# Patient Record
Sex: FEMALE | Race: AMERICAN INDIAN OR ALASKA NATIVE
[De-identification: names, ages, dates, MRNs, and addresses within clinical notes are randomized per-mention and may not be internally consistent; named-entity substitution may affect disease eponyms.]

---

## 2017-04-17 ENCOUNTER — HOSPITAL ENCOUNTER (OUTPATIENT)
Dept: HOSPITAL 5 - OR | Age: 57
Discharge: HOME | End: 2017-04-17
Attending: UROLOGY
Payer: COMMERCIAL

## 2017-04-17 VITALS — DIASTOLIC BLOOD PRESSURE: 69 MMHG | SYSTOLIC BLOOD PRESSURE: 143 MMHG

## 2017-04-17 DIAGNOSIS — I10: ICD-10-CM

## 2017-04-17 DIAGNOSIS — E03.9: ICD-10-CM

## 2017-04-17 DIAGNOSIS — Z79.899: ICD-10-CM

## 2017-04-17 DIAGNOSIS — N13.5: ICD-10-CM

## 2017-04-17 DIAGNOSIS — Z83.3: ICD-10-CM

## 2017-04-17 DIAGNOSIS — N20.1: Primary | ICD-10-CM

## 2017-04-17 DIAGNOSIS — Z82.3: ICD-10-CM

## 2017-04-17 DIAGNOSIS — K21.9: ICD-10-CM

## 2017-04-17 DIAGNOSIS — E66.9: ICD-10-CM

## 2017-04-17 DIAGNOSIS — Z90.49: ICD-10-CM

## 2017-04-17 DIAGNOSIS — Z82.49: ICD-10-CM

## 2017-04-17 DIAGNOSIS — Z80.3: ICD-10-CM

## 2017-04-17 DIAGNOSIS — Z98.890: ICD-10-CM

## 2017-04-17 DIAGNOSIS — J45.909: ICD-10-CM

## 2017-04-17 DIAGNOSIS — Z90.710: ICD-10-CM

## 2017-04-17 DIAGNOSIS — N81.10: ICD-10-CM

## 2017-04-17 PROCEDURE — C1769 GUIDE WIRE: HCPCS

## 2017-04-17 PROCEDURE — 36415 COLL VENOUS BLD VENIPUNCTURE: CPT

## 2017-04-17 PROCEDURE — 74420 UROGRAPHY RTRGR +-KUB: CPT

## 2017-04-17 PROCEDURE — 52341 CYSTO W/URETER STRICTURE TX: CPT

## 2017-04-17 PROCEDURE — 52332 CYSTOSCOPY AND TREATMENT: CPT

## 2017-04-17 PROCEDURE — C2617 STENT, NON-COR, TEM W/O DEL: HCPCS

## 2017-04-17 PROCEDURE — 84132 ASSAY OF SERUM POTASSIUM: CPT

## 2017-04-17 RX ADMIN — HYDROMORPHONE HYDROCHLORIDE PRN MG: 1 INJECTION, SOLUTION INTRAMUSCULAR; INTRAVENOUS; SUBCUTANEOUS at 11:46

## 2017-04-17 RX ADMIN — HYDROMORPHONE HYDROCHLORIDE PRN MG: 1 INJECTION, SOLUTION INTRAMUSCULAR; INTRAVENOUS; SUBCUTANEOUS at 11:41

## 2017-04-17 NOTE — SHORT STAY SUMMARY
Short Stay Documentation


Date of service: 04/17/17





- History


H&P: obtained from office





- Allergies and Medications


Current Medications: 


 Allergies





No Known Allergies Allergy (Verified 04/14/17 11:36)


 





 Home Medications











 Medication  Instructions  Recorded  Confirmed  Last Taken  Type


 


ALBUTEROL Inhaler [Proair] 2 puff IH QID PRN 04/14/17 04/17/17 2 Months Ago 

History


 


Hydrochlorothiazide [HCTZ] 25 mg PO QDAY 04/14/17 04/17/17 04/16/17 History


 


Levothyroxine [Synthroid] 100 mcg PO QAM 04/14/17 04/14/17 04/17/17 08:10 

History


 


Lisinopril [Zestril] 20 mg PO QDAY 04/14/17 04/14/17 04/17/17 08:10 History


 


oxyCODONE /ACETAMINOPHEN [Percocet 1 tab PO PRN PRN 04/14/17 04/14/17 Unknown 

History





5/325 mg]     


 


Latanoprost [Latanoprost] 1 drop OU HS 04/17/17 04/17/17 04/16/17 History








Active Medications





Cefazolin Sodium (Ancef/Sterile Water 2 Gm/20 Ml)  2 gm IV PREOP NR


   Stop: 04/17/17 23:59


Famotidine (Pepcid)  20 mg PO PREOP NR


   Stop: 04/17/17 23:59


   Last Admin: 04/17/17 10:00 Dose:  20 mg


Fentanyl (Sublimaze)  50 mcg IV Q5MIN PRN


   PRN Reason: Pain , Severe (7-10)


   Stop: 04/17/17 23:59


Hydromorphone HCl (Dilaudid)  0.25 mg IV Q5MIN PRN


   PRN Reason: Pain, Moderate (4-6)


   Stop: 04/17/17 23:59


Sodium Chloride (Nacl 0.9% 1000 Ml)  1,000 mls @ 100 mls/hr IV AS DIRECT AISHA


   Stop: 04/17/17 23:59


   Last Admin: 04/17/17 10:10 Dose:  100 mls/hr


Midazolam HCl (Versed)  2 mg IV PREOP NR


   Stop: 04/17/17 23:59


   Last Admin: 04/17/17 10:15 Dose:  2 mg


Ondansetron HCl (Zofran)  4 mg IV ONCE PRN


   PRN Reason: Nausea And Vomiting


   Stop: 04/17/17 23:59











- Brief post op/procedure progress note


Date of procedure: 04/17/17


Pre-op diagnosis: left distal ureteral stone


Post-op diagnosis: other (passed stone, stricture)


Procedure: 





cysto, rpg,ureteral dilation, left ureteroscopy, stent with external string


Anesthesia: KACI


Surgeon: MADHAV LOPEZ


Estimated blood loss: none


Pathology: none


Condition: stable





- Hospital course


Hospital course: 








norco & cipro on chart





- Disposition


Condition at discharge: Stable





Short Stay Discharge Plan


Follow up with: 


NATHAN JONES MD [Primary Care Provider] - 7 Days

## 2017-04-17 NOTE — OPERATIVE REPORT
PREOPERATIVE DIAGNOSIS:  Left distal stone.



POSTOPERATIVE DIAGNOSES:  Left distal stone, passed the stone, left distal

ureteral stricture.



PROCEDURE:  Cystoscopy, bilateral retrograde pyelograms, dilation of left

ureter, left flexible ureterorenoscopy, double-J stent (6 Taiwanese 24 cm with an

external string).



SURGEON:  Casimiro Yeung MD



ANESTHESIA:  General.



ESTIMATED BLOOD LOSS:  Minimal.



FLUIDS:  Crystalloid.



COMPLICATIONS:  No complications.



INDICATIONS:  This patient is a 56-year-old female actually seen a year ago in

the office for stones who lost to followup.  CT at that time revealed 2 mm

bilateral stones.  Now, she presents with worsening pain.  She does do lifting

at work.  KUB in the office showed distal calcification.  Based on these

findings, we agreed to proceed with surgical intervention.



DESCRIPTION OF PROCEDURE:  The patient was taken to the operative suite, placed

in a supine position.  After adequate general anesthesia, she was placed in a

dorsal lithotomy position, prepped and draped in a sterile fashion.  She was

noted to have a moderate cystocele.  Pancystourethroscopy was performed with a

22 Taiwanese Storz cystoscope, no acute bladder pathology.  No tumors or stones

were noted.  Mild amount of erythema around the left ureteral orifice.  The

cystocele was reduced with ______ in the vaginal vault.  Bilateral retrograde

pyelograms were obtained with an 8 Taiwanese Rosedale catheter and 8 mL of

contrast.  No filling defects or obstruction on the right.  Left side, still

questionable stone in the distal ureter.  Two 0.035 guidewires were placed in

the right and left collecting system under fluoroscopic guidance.  Attempts at

ureteroscopy was unsuccessful due to stenosis.  A 20-Taiwanese access sheath was

inserted and gentle dilation was performed.  Ureterorenoscopy was performed.  No

stone could be appreciated.  The stenosis was now dilated.  She may have passed

a stone.  A 6-Taiwanese 24 cm double-J stent was left indwelling.  Bladder was

drained.  She was extubated and taken to recovery room.  She will go home on

Cipro and Norco and follow up in the office.



Primary care is Dr. Jatinder Hernandez, gastroenterologist is Dr. Gerardo Currie.





DD: 04/17/2017 11:28

DT: 04/17/2017 12:50

JOB# [id number]  [id number]

[facility]

## 2017-04-17 NOTE — ANESTHESIA DAY OF SURGERY
Anesthesia Day of Surgery





- Day of Surgery


Patient is NPO: Yes


Beta Blockers: No


Cardiac Clearance: No


Pulmonary Clearance: No

## 2017-04-17 NOTE — ANESTHESIA CONSULTATION
Anesthesia Consult and Med Hx


Date of service: 04/17/17





- Airway


Anesthetic Teeth Evaluation: Dentures


ROM Head & Neck: Adequate


Mental/Hyoid Distance: Adequate


Mallampati Class: Class II


Intubation Access Assessment: Probably Good





- Pulmonary Exam


CTA: Yes (clear blbs no wheeze)





- Cardiac Exam


Cardiac Exam: RRR





- Pre-Operative Health Status


ASA Pre-Surgery Classification: ASA3


Proposed Anesthetic Plan: General





- Pulmonary


Hx Smoking: No


Hx Asthma: Yes (RARE INHALER USE/never hospitalized/no ER visits/no recent URI)


Hx Sleep Apnea: No (KENNEDY PRE SCREEN HIGH RISK)





- Cardiovascular System


Hx Hypertension: Yes (X 10 YRS- ON MEDS)





- Central Nervous System


Hx Back Pain: Yes (??kidney stone)





- Gastrointestinal


Hx Gastroesophageal Reflux Disease: Yes (only after meals/not when NPO)





- Endocrine


Hx Renal Disease: Yes (kidney stone)


Hx Hypothyroidism: Yes (ON MEDS)





- Other Systems


Hx Cancer: No


Hx Obesity: Yes

## 2017-04-18 NOTE — FLUOROSCOPY REPORT
RETROGRADE PYELOGRAM:



History: Left ureteral stone.



There is adequate filling of the ureters and intrarenal collecting 

systems with no filling defects or anatomic abnormalities identified. 

Left ureteroscopy was also performed. A left ureteral stent was placed 

which is in good position on the final image. Please correlate with the 

procedural report by Dr. Yeung.

## 2017-04-18 NOTE — ADMIT CRITERIA FORM
Admission Criteria Documentation: 





            AMBULATORY SURGERY  EXCEPTION CRITERIA 





Ambulatory Surgery Exception Criteria


                                                                               (

Place 'X' for any and all applicable criteria): 





Surgery or procedure performed on ambulatory basis may require inpatient stay 

for[A] ANY ONE of the following(1)(2)(3)(4)(5)(6)(7)(8)(9):


[X] I.   A preoperative situation, condition, or finding that warrants 

inpatient stay as indicated by ANY  ONE of the following:


   []   a)   Inpatient care needed because of severity of a disease or 

condition rather than the surgery (eg, 


              severe cardiac or respiratory disease, severe infection) (15) (16

) (17) (18)


   []   b)   Emergent procedure (eg, angioplasty for acute ischemia)(19)


   []   c)   Complex surgical approach or situation as indicated by ANY  ONE of 

the following(3):


        []   i)    Open approach needed instead of usual endoscopic, 

transcatheter, or other less invasive procedure


        []   ii)   Difficult approach because of previous operation


        []   iii)  Airway monitoring required after open neck procedures(20)(21)


        []   iv)  Large mass requiring unusually extensive dissection


        []   v)  Additional complicating feature requiring inpatient care (eg, 

drain management)(22(23):


   [X]   d)   Major surgery in a pt with high anesthetic risk as indicated by 

ANY  ONE of the following (2)(3)(5)(7)(8):


        [X]   i)     ASA risk class III or higher (severe systemic disease 

impairing function) [D]


        []   ii)    Advanced age (eg, older than 85 years)(14)(24)             

                   


        []   iii)   Symptomatic heart failure(25)


        []   iv)   Symptomatic asthma or COPD(8)(21)


        []    v)   Morbid obesity with hemodynamic or respiratory problems(20)(

21)(26)(27)


        []   vi)   Obstructive sleep apnea(20)(21)


        []   vii)   Former premature infants who are younger than 60 weeks


        []   viii)  High risk for severe postoperative abnormalities (eg, 

severe postoperative hypocalcemia 


                    after parathyroidectomy for severe hyperparathyroidism)(27)(

28)


        []   ix)   Unstable angina(25) 


   []   e)  Drug-related risk requiring inpatient stay as indicated by ANY  ONE

  of the following(5)(10)(14)(32)(33)


        []   i)     Procedure requires discontinuing drugs or other therapy (eg

, antiarrhythmic    medication,   


                    antiseizure medication), which necessitates inpatient 

observation or treatment.(18)(31)


        []   ii)    Major surgery and high risk drug use as indicated by ANY  

ONE   of the following:


             []     1)    Active abuse of cocaine or similar drug      


             []     2)    Monoamine oxidase inhibitor use


             []     3)    Other drug identified as posing risk


   []   f)   Inadequate outpatient care situation as indicated by ANY  ONE  of 

the following(5)(10)(14)(32)(33)


        []   i)   Patient lives remote from medical facility and procedure has 

urgent complication potential, 


                  and temporary nearby residence cannot be arranged


        []  ii)   Patient will have postprocedure incapacitation and inadequate 

assistance at home, or alternative level of care cannot be arranged.


        []  iii)   Patient will have long general anesthesia or procedure side 

effect resolution time, and 


                  competent person to stay with patient on first postoperative 

night at home or alternative  level of care cannot be arranged.


        []iv)    Other inadequate outpatient situation that cannot be handled 

by other means


[] II.  A perioperative event, condition, or finding that warrants inpatient 

stay as indicated by ANY  ONE of the following (1)(2)(3):        


   []   a)   Inadequate physiologic recovery: cardiovascular,  respiratory, or 

hemodynamic status not normal or near preoperative baseline(18)                

                                                                               

          


   []   b)   Hemodynamic instability


   []   c)   Patient not alert with near normal or baseline mental status


   []   d)   Temperature not normal or as expected and not appropriate for 

outpatient treatment of condition 


   []   e)   Ambulatory or appropriate activity level status not yet achieved 

post procedure [E](34)(35)(36)


   []   f)    Operative site not appropriate (eg, unexpected or excessive 

drainage or bleeding)


   []   g)   Postoperative effects not resolved or adequately managed (eg, 

significant pain or vomiting not 


              appropriate for outpatient or next level of care)(10)(12)


   []   h)   Complicating features requiring inpatient care as indicated by ANY

  ONE  of the following(37):


        []   i)    Severe complications of procedure (eg, bowel injury, airway 

compromise, vascular injury,severe hemorrhage)


        []   ii)   Extensive (eg, dissection far beyond usual scope of procedure

) or prolonged (eg, 120 minutes  


                   beyond usual) surgery needed requiring inpatient 

postoperative care


        []   iii)  Conversion to an open or complex procedure that requires 

inpatient care (eg, open vs 


                   laparoscopic cholecystectomy, abdominal vs vaginal 

hysterectomy)(38)


        []   iv)  Comorbid condition or test result identified during or post 

procedure that requires inpatient care (7)


        []   v)   Malignant hyperthermia(30)


        []   vi)  Other complicating feature requiring inpatient care(22)(23)











Inpatient stay may be needed until ALL of the following are present (1)(2)(3)(4)

(5)(6)(10)(14)(33)(40):


[]a)   Physiologic recovery: cardiovascular, respiratory, and hemodynamic 

status normal or near preoperative baseline


[]b)   Hemodynamic stability    


[]c)   Patient alert, with near normal or baseline mental status


[]d)   Temperature appropriate: patient afebrile or temperature appropriate for 

outpt treatment of condition 


[]e)   Activity level appropriate: ambulatory or appropriate activity level 

post procedure


[]f)   Operative site appropriate as indicated by ALL of the following:


       []i)    Site dry or with expected drainage 


       []ii)   Any blood noted is as expected for procedure.


[]g)  Postoperative effects resolved or managed as indicated by ALL of the 

following:


       []i)    Pain management appropriate for outpatient (or next level of) 

care(10)


       []ii)   Minimal nausea and vomiting: if present, successfully treated 

with oral medication(12)


       []iii)   Headache, dizziness, or drowsiness (if present) are mild.


[]h)  Voiding status acceptable as indicated by ANY  ONE  of the following:


       []i)    Voiding spontaneously    


       []ii)   No voiding but instructions given for follow-up in 6 to 8 hours 


       []iii)  Urinary catheter in place, and instructions given for follow-up


[]i)   Complicating features requiring inpatient care manageable at a lower 

level of care(37)


[]j)   Comorbid conditions manageable at a lower level of care(37)











The original [employer] content created by [employer] has been revised. 


The portions of the content which have been revised are identified through the 

use of italic text or in bold, and [facility] [email or web address] 


has neither reviewed nor approved the modified material. All other unmodified 

content is copyright  [employer].





Please see references footnoted in the original [employer] edition 

2016                                                                    


   


Admission Criteria Met: Yes

## 2021-01-06 ENCOUNTER — HOSPITAL ENCOUNTER (OUTPATIENT)
Dept: HOSPITAL 5 - SPVWC | Age: 61
Discharge: HOME | End: 2021-01-06
Attending: SURGERY
Payer: COMMERCIAL

## 2021-01-06 DIAGNOSIS — Z12.31: Primary | ICD-10-CM

## 2021-01-06 PROCEDURE — 77067 SCR MAMMO BI INCL CAD: CPT

## 2021-01-19 ENCOUNTER — HOSPITAL ENCOUNTER (OUTPATIENT)
Dept: HOSPITAL 5 - SPVWC | Age: 61
Discharge: HOME | End: 2021-01-19
Attending: SURGERY
Payer: COMMERCIAL

## 2021-01-19 DIAGNOSIS — R92.1: Primary | ICD-10-CM

## 2021-01-19 NOTE — MAMMOGRAPHY REPORT
DIGITAL DIAGNOSTIC MAMMOGRAM WITH CAD , 1/19/2021



CLINICAL INFORMATION / INDICATION: ABNORMAL MAMMO



TECHNIQUE:  Digital right mammographic imaging was performed. Magnification views were obtained.

This examination was interpreted with the benefit of Computer-aided Detection analysis. 



COMPARISON: 1/6/2021



FINDINGS: 



Breast Density: The breasts are heterogeneously dense, which may obscure small masses.



There are grouped calcifications in the right breast at 9:00, middle depth,  spanning a distance of 1
5 mm. Calcifications are heterogeneous in appearance, and will require further evaluation with biopsy
. Calcifications are amenable to stereotactic guided biopsy if clinically desired.



IMPRESSION: Heterogeneous grouped calcifications right breast, 9:00. Recommend stereotactic biopsy.



Follow up recommendation: Biopsy



BI-RADS Category 4: Suspicious for Malignancy. 



-------------------------------------------------------------------------------------------

A "normal" or negative report should not discourage follow up or biopsy of a clinically significant f
inding.



A written summary of these findings will be mailed to the patient. The patient will be entered into a
 mammography reporting system which will generate a reminder letter for the patient's next appointmen
t at the appropriate interval.



According to the American College of Radiology, yearly mammograms are recommended starting at age 40 
and continuing as long as a woman is in good health.  Breast MRI is recommended for women with an louise
roximately 20-25% or greater lifetime risk of breast cancer, including women with a strong family his
tory of breast or ovarian cancer and women who have been treated for Hodgkin's disease.



Signer Name: Tania Sanders MD 

Signed: 1/19/2021 10:22 AM

Workstation Name: [id number]

## 2021-02-03 ENCOUNTER — HOSPITAL ENCOUNTER (OUTPATIENT)
Dept: HOSPITAL 5 - SPVWC | Age: 61
Discharge: HOME | End: 2021-02-03
Attending: SURGERY
Payer: COMMERCIAL

## 2021-02-03 DIAGNOSIS — R92.1: Primary | ICD-10-CM

## 2021-02-03 DIAGNOSIS — D05.11: ICD-10-CM

## 2021-02-03 DIAGNOSIS — Z98.890: ICD-10-CM

## 2021-02-03 DIAGNOSIS — Z90.49: ICD-10-CM

## 2021-02-03 DIAGNOSIS — Z87.442: ICD-10-CM

## 2021-02-03 DIAGNOSIS — I10: ICD-10-CM

## 2021-02-03 DIAGNOSIS — E78.00: ICD-10-CM

## 2021-02-03 DIAGNOSIS — Z79.899: ICD-10-CM

## 2021-02-03 DIAGNOSIS — E66.9: ICD-10-CM

## 2021-02-03 DIAGNOSIS — J45.909: ICD-10-CM

## 2021-02-03 DIAGNOSIS — Z17.0: ICD-10-CM

## 2021-02-03 DIAGNOSIS — Z90.710: ICD-10-CM

## 2021-02-03 DIAGNOSIS — E03.9: ICD-10-CM

## 2021-02-03 DIAGNOSIS — K21.9: ICD-10-CM

## 2021-02-03 DIAGNOSIS — H40.9: ICD-10-CM

## 2021-02-03 PROCEDURE — 88305 TISSUE EXAM BY PATHOLOGIST: CPT

## 2021-02-03 PROCEDURE — 77065 DX MAMMO INCL CAD UNI: CPT

## 2021-02-03 PROCEDURE — A4648 IMPLANTABLE TISSUE MARKER: HCPCS

## 2021-02-03 PROCEDURE — 19081 BX BREAST 1ST LESION STRTCTC: CPT

## 2021-02-03 NOTE — MAMMOGRAPHY REPORT
PERCUTANEOUS STEREOTACTIC-GUIDED RIGHT BREAST BIOPSY WITH MARKER PLACEMENT



HISTORY: Right breast calcifications.



CONSENT: Technique, risks and alternatives were discussed with the patient and informed written conse
nt obtained.



PROCEDURE:



The patient was placed in the prone position on the Siemens biopsy table. The calcifications within t
he right breast at the 9:00 position were targeted mammographically.  and stereo pair images wer
e acquired to generate the computer-derived coordinates for targeting. The skin overlying the chosen 
biopsy site were cleansed with Betadine.  The skin and superficial soft tissues were anesthetized wit
h a small amount of buffered 1% lidocaine.  The deeper soft tissues were anesthetized with buffered 1
% lidocaine with epinephrine.



A small dermatotomy was created through which the [facility] biopsy device was placed. Pre and post fire st
ereo pair images were acquired to confirm appropriate needle trajectory. Using vacuum assistance, mul
tiple core specimen samples were acquired. A post procedure specimen radiograph confirmed calcificati
ons. Additional samples were obtained and more calcifications were identified.  A biopsy marker was d
eposited at the biopsy site, and appropriate biopsy marker deposition confirmed via a stereo pair gustavo
ge.



Manual pressure was applied at the biopsy site to achieve hemostasis. The incision margins were appro
ximated with Steri-Strips. Postprocedure care instructions were administered in both verbal and writt
en forms. The patient voiced understanding and departed the [facility] in stable, satisfactory con
dition.



**IMPRESSION**

 

Technically successful stereotactic biopsy of calcifications within the right breast at the 9:00 posi
tion with accurate placement of a biopsy marker.



An addendum will be added to this report once pathology results are available.



Signer Name: Sohail Correia MD 

Signed: 2/3/2021 2:46 PM

Workstation Name: [id number]

## 2021-02-03 NOTE — MAMMOGRAPHY REPORT
RIGHT DIAGNOSTIC MAMMOGRAM

 

INDICATION: Right lateral breast calcifications. 



COMPARISON: 1/19/2021, 1/6/2021.



FINDINGS: Right breast CC and ML projection mammograms were obtained. These document accurate locatio
n of a biopsy marker status post stereotactic biopsy of right breast calcifications at the 9:00 posit
ion.



IMPRESSION:



Technically successful stereotactic biopsy of right breast calcifications at the 9:00 position with a
ccurate biopsy marker placement.



BI-RADS Category 4: Suspicious for Malignancy. 





Signer Name: Sohail Correia MD 

Signed: 2/3/2021 2:33 PM

Workstation Name: [id number]

## 2021-03-04 ENCOUNTER — HOSPITAL ENCOUNTER (OUTPATIENT)
Dept: HOSPITAL 5 - SPVWC | Age: 61
Discharge: HOME | End: 2021-03-04
Attending: INTERNAL MEDICINE
Payer: COMMERCIAL

## 2021-03-04 DIAGNOSIS — Z13.820: Primary | ICD-10-CM

## 2021-03-04 DIAGNOSIS — D05.11: ICD-10-CM

## 2021-03-04 DIAGNOSIS — E66.9: ICD-10-CM

## 2021-03-04 PROCEDURE — 77080 DXA BONE DENSITY AXIAL: CPT

## 2021-03-04 NOTE — MAMMOGRAPHY REPORT
DEXA BONE DENSITY SCAN



INDICATION: 

OSTEOPOROSIS SCREENING.



COMPARISON: 

None available.



LUMBAR SPINE (L1-L4):

Bone mineral density (BMD) is 1.063 g/cm2.

T-score is -0.8 (standard deviations of Young Adult mean).

Z-score is 1 (standard deviations of Age Matched mean).



LEFT FEMORAL NECK:

Bone mineral density (BMD) is 0.868 g/cm2.

T-score is -0.6 (standard deviations of Young Adult mean).

Z-score is 0.6 (standard deviations of Age Matched mean).





IMPRESSION:

1. WHO Classification: Normal bone density. Fracture Risk: Not Increased.



Signer Name: Red Gabriel MD 

Signed: 3/4/2021 3:38 PM

Workstation Name: [email or web address]

## 2021-03-22 LAB
BUN SERPL-MCNC: 12 MG/DL (ref 7–17)
BUN/CREAT SERPL: 17 %
CALCIUM SERPL-MCNC: 9.6 MG/DL (ref 8.4–10.2)
HCT VFR BLD CALC: 36.9 % (ref 30.3–42.9)
HEMOLYSIS INDEX: 0
HGB BLD-MCNC: 11.9 GM/DL (ref 10.1–14.3)
MCHC RBC AUTO-ENTMCNC: 32 % (ref 30–34)
MCV RBC AUTO: 79 FL (ref 79–97)
PLATELET # BLD: 265 K/MM3 (ref 140–440)
RBC # BLD AUTO: 4.7 M/MM3 (ref 3.65–5.03)

## 2021-03-26 ENCOUNTER — HOSPITAL ENCOUNTER (OUTPATIENT)
Dept: HOSPITAL 5 - OR | Age: 61
Discharge: HOME | End: 2021-03-26
Attending: SURGERY
Payer: COMMERCIAL

## 2021-03-26 VITALS — DIASTOLIC BLOOD PRESSURE: 75 MMHG | SYSTOLIC BLOOD PRESSURE: 160 MMHG

## 2021-03-26 DIAGNOSIS — M19.90: ICD-10-CM

## 2021-03-26 DIAGNOSIS — Z79.84: ICD-10-CM

## 2021-03-26 DIAGNOSIS — I10: ICD-10-CM

## 2021-03-26 DIAGNOSIS — Z90.710: ICD-10-CM

## 2021-03-26 DIAGNOSIS — H40.9: ICD-10-CM

## 2021-03-26 DIAGNOSIS — Z79.899: ICD-10-CM

## 2021-03-26 DIAGNOSIS — Z88.8: ICD-10-CM

## 2021-03-26 DIAGNOSIS — J45.909: ICD-10-CM

## 2021-03-26 DIAGNOSIS — Z80.8: ICD-10-CM

## 2021-03-26 DIAGNOSIS — E78.00: ICD-10-CM

## 2021-03-26 DIAGNOSIS — Z98.890: ICD-10-CM

## 2021-03-26 DIAGNOSIS — E03.9: ICD-10-CM

## 2021-03-26 DIAGNOSIS — Z87.442: ICD-10-CM

## 2021-03-26 DIAGNOSIS — C50.411: Primary | ICD-10-CM

## 2021-03-26 DIAGNOSIS — Z80.2: ICD-10-CM

## 2021-03-26 DIAGNOSIS — K21.9: ICD-10-CM

## 2021-03-26 DIAGNOSIS — Z20.822: ICD-10-CM

## 2021-03-26 PROCEDURE — 19301 PARTIAL MASTECTOMY: CPT

## 2021-03-26 PROCEDURE — 82962 GLUCOSE BLOOD TEST: CPT

## 2021-03-26 PROCEDURE — 64450 NJX AA&/STRD OTHER PN/BRANCH: CPT

## 2021-03-26 PROCEDURE — U0003 INFECTIOUS AGENT DETECTION BY NUCLEIC ACID (DNA OR RNA); SEVERE ACUTE RESPIRATORY SYNDROME CORONAVIRUS 2 (SARS-COV-2) (CORONAVIRUS DISEASE [COVID-19]), AMPLIFIED PROBE TECHNIQUE, MAKING USE OF HIGH THROUGHPUT TECHNOLOGIES AS DESCRIBED BY CMS-2020-01-R: HCPCS

## 2021-03-26 PROCEDURE — 76098 X-RAY EXAM SURGICAL SPECIMEN: CPT

## 2021-03-26 PROCEDURE — 88307 TISSUE EXAM BY PATHOLOGIST: CPT

## 2021-03-26 PROCEDURE — 19281 PERQ DEVICE BREAST 1ST IMAG: CPT

## 2021-03-26 PROCEDURE — 88342 IMHCHEM/IMCYTCHM 1ST ANTB: CPT

## 2021-03-26 PROCEDURE — A4648 IMPLANTABLE TISSUE MARKER: HCPCS

## 2021-03-26 PROCEDURE — 80048 BASIC METABOLIC PNL TOTAL CA: CPT

## 2021-03-26 PROCEDURE — 88341 IMHCHEM/IMCYTCHM EA ADD ANTB: CPT

## 2021-03-26 PROCEDURE — 85027 COMPLETE CBC AUTOMATED: CPT

## 2021-03-26 PROCEDURE — 36415 COLL VENOUS BLD VENIPUNCTURE: CPT

## 2021-03-26 NOTE — SHORT STAY SUMMARY
Short Stay Documentation


Date of service: 03/26/21





- History


H&P: obtained from office





- Allergies and Medications


Current Medications: 


                                    Allergies





lisinopril Adverse Reaction (Verified 03/26/21 10:02)


   COUGHING





                                Home Medications











 Medication  Instructions  Recorded  Confirmed  Last Taken  Type


 


Albuterol Mdi (or & Nicu Only) 2 puff IH QID PRN 04/14/17 03/26/21 03/12/21 

08:00 History





[Proair]     


 


Levothyroxine [Synthroid] 100 mcg PO QAM 04/14/17 03/26/21 03/26/21 06:00 

History


 


hydroCHLOROthiazide [HCTZ] 25 mg PO QDAY 04/14/17 03/26/21 03/25/21 08:00 

History


 


oxyCODONE /ACETAMINOPHEN [Percocet 1 tab PO PRN PRN 04/14/17 03/26/21 02/01/21 

08:00 History





5/325 mg]     


 


Latanoprost 1 drop OU HS 04/17/17 03/26/21 02/26/21 21:00 History


 


metFORMIN [Glucophage] 250 mg PO QDAY 03/22/21 03/26/21 03/19/21 08:00 History


 


RX: Ibuprofen [Motrin 800 MG tab] 800 mg PO Q8HR PRN #12 tablet 03/26/21  

Unknown Rx








Active Medications





Acetaminophen (Acetaminophen 500 Mg Tab)  1,000 mg PO PREOP AISHA


   Last Admin: 03/26/21 08:10 Dose:  1,000 mg


   Documented by: 


Hydrocodone Bitart/Acetaminophen (Hydrocodone/Acetaminophen 5-325 Mg Tab)  2 

each PO ONCE PRN


   PRN Reason: Pain, Moderate (4-6)


Celecoxib (Celecoxib 200 Mg Cap)  200 mg PO PREOP NR


   Stop: 03/26/21 23:01


   Last Admin: 03/26/21 08:10 Dose:  200 mg


   Documented by: 


Fentanyl (Fentanyl 100 Mcg/2 Ml Inj)  100 mcg IV ONCE PRN


   PRN Reason: sedation for nerve block


   Last Admin: 03/26/21 08:47 Dose:  100 mcg


   Documented by: 


Fentanyl (Fentanyl 100 Mcg/2 Ml Inj)  50 mcg IV Q5MIN PRN


   PRN Reason: Pain , Severe (7-10)


Gabapentin (Gabapentin 300 Mg Cap)  300 mg PO PREOP NR


   Stop: 03/26/21 23:01


   Last Admin: 03/26/21 08:10 Dose:  300 mg


   Documented by: 


Cefazolin Sodium (Ancef/Sterile Water 2 Gm/20 Ml)  2 gm in 20 mls @ 80 mls/hr IV

PREOP NR; Protocol


   Stop: 03/26/21 23:00


Lactated Ringer's (Lactated Ringers)  1,000 mls @ 100 mls/hr IV AS DIRECT AISHA


   Stop: 03/26/21 23:59


   Last Admin: 03/26/21 08:20 Dose:  100 mls/hr


   Documented by: 


Midazolam HCl (Midazolam 2 Mg/2 Ml Inj)  2 mg IV PREOP NR


   Stop: 03/26/21 23:00


   Last Admin: 03/26/21 08:46 Dose:  2 mg


   Documented by: 


Ondansetron HCl (Ondansetron 4 Mg/2 Ml Inj)  4 mg IV ONCE PRN


   PRN Reason: Nausea And Vomiting











- Brief post op/procedure progress note


Date of procedure: 03/26/21


Pre-op diagnosis: Right breast cancer upper outer quadrant


Post-op diagnosis: same


Procedure: 





Right needle localization partial mastectomy of the upper outer quadrant


Anesthesia: GETA


Findings: 





Right wire and clip present


Surgeon: DUNCAN MCKEON


Estimated blood loss: minimal


Pathology: list (right partial mastectomy)


Specimen disposition: to lab


Condition: stable





- Disposition


Condition at discharge: Good


Disposition: DC-01 TO HOME OR SELFCARE





Short Stay Discharge Plan


Activity: other (no heavy lifting)


Diet: regular


Wound: keep clean and dry (may shower in 48 hours; no baths; wear breast binder)


Follow up with: 


DUNCAN MCKEON MD [Staff Physician] - 7 Days


Prescriptions: 


RX: Ibuprofen [Motrin 800 MG tab] 800 mg PO Q8HR PRN #12 tablet


 PRN Reason: Pain , Severe (7-10)

## 2021-03-26 NOTE — MAMMOGRAPHY REPORT
MAMMOGRAPHY GUIDED WIRE LOCALIZATION OF THE RIGHT BREAST

RIGHT BREAST SPECIMEN RADIOGRAPH, 3/26/2021



INDICATION: Localization of right breast target lesion: .



COMPARISON: 2/3/2021



TECHNIQUE / FINDINGS: 



MAMMOGRAPHY GUIDED LOCALIZATION: Preliminary mammography of the breast demonstrated no significant in
terval change compared to prior imaging.  The anticipated needle entry site was marked, prepped and d
raped in the usual sterile fashion. A timeout was performed confirming the patients name, date of bir
th and the procedure to be performed. The skin was anesthetized with 1% lidocaine. A 20 gauge localiz
ation needle was inserted through the target lesion.  A localization wire was then deployed through t
he needle, and the needle was removed. Appropriate positioning of the wire was confirmed.  The patien
t tolerated the procedure well without immediate complication.  The wire was secured to the skin with
 a sterile dressing. 



SPECIMEN RADIOGRAPH: The previously localized target lesion is present in its entirety in the submitt
ed specimen. The localization wire is present. 



IMPRESSION: 

1.  Technically successful mammography guided breast wire localization.

2.  Radiographic evidence of satisfactory excision of the target lesion. 



Signer Name: Juvencio Downs Jr, MD 

Signed: 3/26/2021 3:51 PM

Workstation Name: [id number]

## 2021-03-26 NOTE — MAMMOGRAPHY REPORT
MAMMOGRAPHY GUIDED WIRE LOCALIZATION OF THE RIGHT BREAST

RIGHT BREAST SPECIMEN RADIOGRAPH, 3/26/2021



INDICATION: Localization of right breast target lesion: .



COMPARISON: 2/3/2021



TECHNIQUE / FINDINGS: 



MAMMOGRAPHY GUIDED LOCALIZATION: Preliminary mammography of the breast demonstrated no significant in
terval change compared to prior imaging.  The anticipated needle entry site was marked, prepped and d
raped in the usual sterile fashion. A timeout was performed confirming the patients name, date of bir
th and the procedure to be performed. The skin was anesthetized with 1% lidocaine. A 20 gauge localiz
ation needle was inserted through the target lesion.  A localization wire was then deployed through t
he needle, and the needle was removed. Appropriate positioning of the wire was confirmed.  The patien
t tolerated the procedure well without immediate complication.  The wire was secured to the skin with
 a sterile dressing. 



SPECIMEN RADIOGRAPH: The previously localized target lesion is present in its entirety in the submitt
ed specimen. The localization wire is present. 



IMPRESSION: 

1.  Technically successful mammography guided breast wire localization.

2.  Radiographic evidence of satisfactory excision of the target lesion. 



Signer Name: Juvencio Downs Jr, MD 

Signed: 3/26/2021 3:51 PM

Workstation Name: [id number]

## 2021-03-26 NOTE — ANESTHESIA CONSULTATION
Anesthesia Consult and Med Hx


Date of service: 03/26/21





- Airway


Anesthetic Teeth Evaluation: Poor, Partials


ROM Head & Neck: Adequate


Mental/Hyoid Distance: Adequate


Mallampati Class: Class I


Intubation Access Assessment: Good





- Pre-Operative Health Status


ASA Pre-Surgery Classification: ASA3


Proposed Anesthetic Plan: General


Nerve Block: PECS II





- Pulmonary


Hx Smoking: No


Hx Asthma: Yes (albuterol use 1x/wk)


Hx Respiratory Symptoms: No


Hx Sleep Apnea: No (compliant with CPAP)





- Cardiovascular System


Hx Hypertension: Yes


Hx Heart Attack/AMI: No


Hx Percutaneous Transluminal Coronary Angioplasty (PTCA): No





- Central Nervous System


CVA: No





- Gastrointestinal


Hx Gastroesophageal Reflux Disease: Yes (diet controlled, asymptomatic today)





- Endocrine


Hx Renal Disease: No


Hx Liver Disease: No


Hx Insulin Dependent Diabetes: No


Hx Non-Insulin Dependent Diabetes: No


Hx Hypothyroidism: Yes (took synthroid this morning)





- Other Systems


Hx Cancer: Yes (breast ca not yet on chemotherapy)


Hx Obesity: Yes (BMI 39)





- Additional Comments


Anesthesia Medical History Comments: No hx anesthetic complications.

## 2021-03-26 NOTE — OPERATIVE REPORT
Operative Report


Operative Report: 





Operative Report: 





March 26, 2021





Preoperative diagnosis: Right breast cancer of the upper outer quadrant





Postoperative diagnosis: Same





Procedure: Right needle localization partial mastectomy of the upper outer 

quadrant





Surgeon: Emily Perales MD





Assistant: IGNACIO Soliz MD





Anesthesia: General





Findings: Right wire and clip present within radiograph specimen





Complications: None





EBL: Minimal, less than 25 cc





Disposition: PACU in good condition





Indications for operative procedure: This is a 60 year old lady with newly 

diagnosed right breast cancer of the upper outer quadrant, Stage 0, [id number] ER 

positive. Recommendations are to proceed with breast conservation. Radiologist 

place wire at area of cancer of  DCIS at location of clip.  She understands the 

role of adjuvant radiation therapy. She wished to proceed with the above 

procedure.





Procedure in detail: The patient was taken to radiology for wire placement for 

localization known area of cancer. Anesthesia placed right pectoral block. 

Patient was then taken to the operating room.  Gen. anesthesia was administered.

Right breast and axilla were prepped and draped in the normal sterile operative 

fashion. The wire was identified. Timeout was performed. Attention was then 

taken towards the right breast. A periareolar breast incision around 9:00 

position was made with a 15 blade knife and dissection taken down to 

subcutaneous tissues. First began raising of the lateral flap with removal of 

the wire from the skin with dissection taken down to the pectoralis muscle and 

past the wire, followed by raising of the medial flap, superior flap and 

inferior flap with all flaps taken down past the wire and posteriorly to the 

pectoralis muscle. The breast area of concern was appropriately removed 

posteriorly from the muscle with the aid of the Bovie cautery. The wire was not 

encountered. Specimen was marked and then sent to pathology and radiology; 

radiograph specimen with wire and clip present. Breast cavity was irrigated and 

hemostasis was obtained. Then proceeded with complex closure. The posterior deep

breast tissues were approximated and closed using interrupted 3-0 Vicryl. The 

subcutaneous tissues were approximated and closed using interrupted 3-0 Vicryl 

followed by closing of the skin with a running 4-0 Monocryl and skin affix. The 

patient tolerated surgery very well and she was awaken from anesthesia without 

any complication and transported to PACU in good condition.